# Patient Record
Sex: FEMALE | Race: WHITE | URBAN - METROPOLITAN AREA
[De-identification: names, ages, dates, MRNs, and addresses within clinical notes are randomized per-mention and may not be internally consistent; named-entity substitution may affect disease eponyms.]

---

## 2022-09-28 PROBLEM — C64.9 MALIGNANT NEOPLASM OF KIDNEY (HCC): Status: ACTIVE | Noted: 2022-09-28

## 2022-09-28 PROBLEM — G89.4 CHRONIC PAIN DISORDER: Status: ACTIVE | Noted: 2020-10-03

## 2022-09-28 PROBLEM — E03.4 ACQUIRED ATROPHY OF THYROID: Status: ACTIVE | Noted: 2022-09-28

## 2022-09-28 PROBLEM — E78.00 PURE HYPERCHOLESTEROLEMIA: Status: ACTIVE | Noted: 2022-09-28

## 2022-09-28 PROBLEM — Q24.5 CORONARY ARTERY ABNORMALITY: Status: ACTIVE | Noted: 2022-09-28

## 2022-09-28 PROBLEM — I21.9 MYOCARDIAL INFARCTION (HCC): Status: ACTIVE | Noted: 2022-09-28

## 2022-09-28 PROBLEM — I10 HYPERTENSION: Status: ACTIVE | Noted: 2022-09-28

## 2022-10-28 PROBLEM — K94.03 COLOSTOMY MALFUNCTION (HCC): Status: ACTIVE | Noted: 2022-10-26

## 2022-11-04 ENCOUNTER — PREP FOR PROCEDURE (OUTPATIENT)
Dept: UROLOGY | Age: 72
End: 2022-11-04

## 2022-11-04 DIAGNOSIS — N20.0 KIDNEY STONES: ICD-10-CM

## 2022-11-04 RX ORDER — SODIUM CHLORIDE 0.9 % (FLUSH) 0.9 %
5-40 SYRINGE (ML) INJECTION EVERY 12 HOURS SCHEDULED
Status: CANCELLED | OUTPATIENT
Start: 2022-11-04

## 2022-11-04 RX ORDER — SODIUM CHLORIDE 0.9 % (FLUSH) 0.9 %
5-40 SYRINGE (ML) INJECTION PRN
Status: CANCELLED | OUTPATIENT
Start: 2022-11-04

## 2022-11-04 RX ORDER — SODIUM CHLORIDE 9 MG/ML
INJECTION, SOLUTION INTRAVENOUS PRN
Status: CANCELLED | OUTPATIENT
Start: 2022-11-04

## 2022-11-04 ASSESSMENT — ENCOUNTER SYMPTOMS
NAUSEA: 1
SHORTNESS OF BREATH: 0
DIARRHEA: 0
ABDOMINAL PAIN: 1

## 2022-11-04 NOTE — H&P
HPI: 76yo female with a history of stones and recurrent UTI. Recent CT urogram showed large 1.8cm left lower pole stone. She has had a recent ostomy and has some abdominal pain as well. Review of Systems   Constitutional:  Negative for chills and fever. Respiratory:  Negative for shortness of breath. Cardiovascular:  Negative for chest pain. Gastrointestinal:  Positive for abdominal pain and nausea. Negative for diarrhea. Genitourinary:  Positive for urgency. Negative for flank pain and hematuria. Physical Exam  Constitutional:       Appearance: Normal appearance. HENT:      Head: Normocephalic. Cardiovascular:      Rate and Rhythm: Normal rate. Pulmonary:      Effort: Pulmonary effort is normal.   Abdominal:      Palpations: Abdomen is soft. Musculoskeletal:         General: Normal range of motion. Neurological:      General: No focal deficit present. Mental Status: She is alert. Plan: To OR for left ESWL and stent.

## 2022-11-14 PROBLEM — K94.03 COLOSTOMY DYSFUNCTION (HCC): Status: ACTIVE | Noted: 2022-11-14

## 2022-11-17 PROBLEM — K94.03 COLOSTOMY DYSFUNCTION (HCC): Status: RESOLVED | Noted: 2022-11-14 | Resolved: 2022-11-17

## 2022-11-17 PROBLEM — K94.03 COLOSTOMY MALFUNCTION (HCC): Status: RESOLVED | Noted: 2022-10-26 | Resolved: 2022-11-17

## 2024-01-24 ENCOUNTER — NEW PATIENT (OUTPATIENT)
Facility: LOCATION | Age: 74
End: 2024-01-24

## 2024-01-24 DIAGNOSIS — H43.813: ICD-10-CM

## 2024-01-24 DIAGNOSIS — H25.13: ICD-10-CM

## 2024-01-24 PROCEDURE — 92004 COMPRE OPH EXAM NEW PT 1/>: CPT

## 2024-01-24 PROCEDURE — 92015 DETERMINE REFRACTIVE STATE: CPT

## 2024-01-24 ASSESSMENT — KERATOMETRY
OD_AXISANGLE2_DEGREES: 3
OS_K2POWER_DIOPTERS: 46.00
OS_K1POWER_DIOPTERS: 44.75
OD_AXISANGLE_DEGREES: 93
OD_K2POWER_DIOPTERS: 45.50
OD_K1POWER_DIOPTERS: 44.25
OS_AXISANGLE_DEGREES: 69
OS_AXISANGLE2_DEGREES: 159

## 2024-01-24 ASSESSMENT — VISUAL ACUITY
OD_GLARE: 20/60
OU_CC: 20/25
OD_CC: 20/25-2
OS_GLARE: 20/80
OS_CC: 20/25

## 2024-01-24 ASSESSMENT — TONOMETRY
OD_IOP_MMHG: 13
OS_IOP_MMHG: 13

## 2024-02-05 ENCOUNTER — PRE-OP/H&P (OUTPATIENT)
Facility: LOCATION | Age: 74
End: 2024-02-05

## 2024-02-05 DIAGNOSIS — H25.13: ICD-10-CM

## 2024-02-05 PROCEDURE — 99211PRE PRE OP VISIT

## 2024-02-05 PROCEDURE — 92136 OPHTHALMIC BIOMETRY: CPT

## 2024-03-26 PROBLEM — Z96.1: Noted: 2024-03-26

## 2024-03-27 ENCOUNTER — POST-OP (OUTPATIENT)
Facility: LOCATION | Age: 74
End: 2024-03-27

## 2024-03-27 DIAGNOSIS — Z96.1: ICD-10-CM

## 2024-03-27 PROCEDURE — 99024 POSTOP FOLLOW-UP VISIT: CPT

## 2024-03-27 ASSESSMENT — VISUAL ACUITY: OS_SC: 20/30-2

## 2024-03-27 ASSESSMENT — TONOMETRY: OS_IOP_MMHG: 12

## 2024-04-10 ENCOUNTER — POST OP/EVAL OF SECOND EYE (OUTPATIENT)
Facility: LOCATION | Age: 74
End: 2024-04-10

## 2024-04-10 DIAGNOSIS — Z96.1: ICD-10-CM

## 2024-04-10 DIAGNOSIS — H25.11: ICD-10-CM

## 2024-04-10 PROCEDURE — 92136 OPHTHALMIC BIOMETRY: CPT | Mod: 26,RT

## 2024-04-10 ASSESSMENT — VISUAL ACUITY: OS_SC: 20/30-1

## 2024-04-16 PROBLEM — Z96.1: Noted: 2024-04-16

## 2024-04-16 PROBLEM — Z96.1: Noted: 2024-03-26

## 2024-04-17 ENCOUNTER — POST-OP (OUTPATIENT)
Facility: LOCATION | Age: 74
End: 2024-04-17

## 2024-04-17 DIAGNOSIS — Z96.1: ICD-10-CM

## 2024-04-17 PROCEDURE — 99024 POSTOP FOLLOW-UP VISIT: CPT

## 2024-04-17 ASSESSMENT — VISUAL ACUITY
OD_SC: 20/40
OD_SC: J3

## 2024-04-17 ASSESSMENT — TONOMETRY: OD_IOP_MMHG: 10

## 2024-04-30 ENCOUNTER — POST-OP (OUTPATIENT)
Facility: LOCATION | Age: 74
End: 2024-04-30

## 2024-04-30 DIAGNOSIS — Z96.1: ICD-10-CM

## 2024-04-30 PROCEDURE — 99024 POSTOP FOLLOW-UP VISIT: CPT

## 2024-04-30 ASSESSMENT — TONOMETRY: OD_IOP_MMHG: 10

## 2024-04-30 ASSESSMENT — VISUAL ACUITY: OD_SC: 20/40

## 2024-05-21 ENCOUNTER — CONTACT LENSES/GLASSES VISIT (OUTPATIENT)
Facility: LOCATION | Age: 74
End: 2024-05-21

## 2024-05-21 ASSESSMENT — VISUAL ACUITY
OS_SC: 20/50-2
OD_SC: 20/50-2

## 2024-05-31 ENCOUNTER — TECH ONLY (OUTPATIENT)
Facility: LOCATION | Age: 74
End: 2024-05-31

## 2024-05-31 DIAGNOSIS — Z96.1: ICD-10-CM

## 2024-05-31 PROCEDURE — 99211NC NO CHARGE VISIT

## 2024-06-10 ENCOUNTER — FOLLOW UP (OUTPATIENT)
Facility: LOCATION | Age: 74
End: 2024-06-10

## 2024-06-10 DIAGNOSIS — H26.493: ICD-10-CM

## 2024-06-10 PROCEDURE — 92015 DETERMINE REFRACTIVE STATE: CPT | Mod: NC

## 2024-06-10 ASSESSMENT — VISUAL ACUITY
OU_CC: 20/40
OS_CC: 20/50-1
OD_CC: 20/40+2
OD_GLARE: 20/70

## 2024-07-09 ENCOUNTER — SURGERY/PROCEDURE (OUTPATIENT)
Dept: URBAN - METROPOLITAN AREA SURGERY 6 | Facility: SURGERY | Age: 74
End: 2024-07-09

## 2024-07-09 DIAGNOSIS — H26.493: ICD-10-CM

## 2024-07-09 PROCEDURE — 66821 AFTER CATARACT LASER SURGERY: CPT | Mod: 79,LT

## 2024-07-16 ENCOUNTER — SURGERY/PROCEDURE (OUTPATIENT)
Dept: URBAN - METROPOLITAN AREA SURGERY 6 | Facility: SURGERY | Age: 74
End: 2024-07-16

## 2024-07-16 DIAGNOSIS — H26.493: ICD-10-CM

## 2024-07-16 PROCEDURE — 66821 AFTER CATARACT LASER SURGERY: CPT | Mod: 79,RT

## 2024-07-23 ENCOUNTER — POST-OP (OUTPATIENT)
Facility: LOCATION | Age: 74
End: 2024-07-23

## 2024-07-23 DIAGNOSIS — Z98.890: ICD-10-CM

## 2024-07-23 PROCEDURE — 99024 POSTOP FOLLOW-UP VISIT: CPT

## 2024-07-23 ASSESSMENT — TONOMETRY
OD_IOP_MMHG: 9
OS_IOP_MMHG: 9

## 2024-07-23 ASSESSMENT — VISUAL ACUITY
OD_CC: 20/25-1
OU_CC: 20/25-1
OS_CC: 20/30-1

## 2024-08-15 ENCOUNTER — CONTACT LENSES/GLASSES VISIT (OUTPATIENT)
Facility: LOCATION | Age: 74
End: 2024-08-15

## 2024-08-15 DIAGNOSIS — H43.813: ICD-10-CM

## 2024-08-15 DIAGNOSIS — Z98.890: ICD-10-CM

## 2024-08-15 PROCEDURE — 92012 INTRM OPH EXAM EST PATIENT: CPT

## 2024-08-15 PROCEDURE — 92134 CPTRZ OPH DX IMG PST SGM RTA: CPT

## 2024-08-15 ASSESSMENT — TONOMETRY
OD_IOP_MMHG: 7
OS_IOP_MMHG: 7

## 2024-08-15 ASSESSMENT — VISUAL ACUITY
OS_BCVA: 20/25
OD_BCVA: 20/25
OU_CC: 20/25
OD_CC: 20/25
OS_CC: 20/25-1

## 2024-09-05 ENCOUNTER — CONTACT LENSES/GLASSES VISIT (OUTPATIENT)
Facility: LOCATION | Age: 74
End: 2024-09-05

## 2024-09-05 DIAGNOSIS — H53.10: ICD-10-CM

## 2024-09-05 PROCEDURE — 92012 INTRM OPH EXAM EST PATIENT: CPT
